# Patient Record
Sex: FEMALE | Race: WHITE | NOT HISPANIC OR LATINO | URBAN - METROPOLITAN AREA
[De-identification: names, ages, dates, MRNs, and addresses within clinical notes are randomized per-mention and may not be internally consistent; named-entity substitution may affect disease eponyms.]

---

## 2019-05-30 NOTE — TELEPHONE ENCOUNTER
Spoke with pt. She stated she wanted to make an appointment. Scheduled for 6/14. Pt was requesting refill of Bentyl. Will request through MD.

## 2019-05-31 ENCOUNTER — TELEPHONE (OUTPATIENT)
Dept: GASTROENTEROLOGY | Facility: CLINIC | Age: 49
End: 2019-05-31

## 2019-05-31 RX ORDER — DICYCLOMINE HYDROCHLORIDE 20 MG/1
20 TABLET ORAL
Qty: 60 TABLET | Refills: 0 | Status: SHIPPED | OUTPATIENT
Start: 2019-05-31 | End: 2019-06-15

## 2019-05-31 NOTE — TELEPHONE ENCOUNTER
----- Message from Audrey Aly LPN sent at 5/31/2019  7:57 AM CDT -----  Call pt to let her know her dicyclomine was filled at [facility] in [place]

## 2019-05-31 NOTE — TELEPHONE ENCOUNTER
Called pt. No answer. Left message and informed her that her rx was filled at [facility] for her.